# Patient Record
Sex: FEMALE | Race: WHITE | NOT HISPANIC OR LATINO | Employment: FULL TIME | URBAN - METROPOLITAN AREA
[De-identification: names, ages, dates, MRNs, and addresses within clinical notes are randomized per-mention and may not be internally consistent; named-entity substitution may affect disease eponyms.]

---

## 2021-05-07 ENCOUNTER — CONVERSION ENCOUNTER (OUTPATIENT)
Dept: FAMILY MEDICINE CLINIC | Facility: CLINIC | Age: 21
End: 2021-05-07

## 2021-05-07 ENCOUNTER — OFFICE VISIT CONVERTED (OUTPATIENT)
Dept: FAMILY MEDICINE CLINIC | Facility: CLINIC | Age: 21
End: 2021-05-07
Attending: STUDENT IN AN ORGANIZED HEALTH CARE EDUCATION/TRAINING PROGRAM

## 2021-06-05 NOTE — H&P
History and Physical      Patient Name: Shelly Gray   Patient ID: [id number]   Sex: Female   YOB: 2000        Visit Date: May 7, 2021    Provider: Edwin Crowe MD   [place] [facility] [place] Address: [address] [place] [address], [address]  [place], [place]  [id number]   Location Phone: [phone]          Chief Complaint  · New patient - Establish care      History Of Present Illness  Shelly Gray is a 20 year old /White female who presents for evaluation and treatment of:      New patient to establish new primary care.  Previous PCP was Dr. Hastings, pediatrics.    PMH: Seasonal allergies, intermittent headaches, menstrual cramps, developmental delay, obesity    Mental disability:  Patient dx as 'Mildly Mentally Disabled'.  She is  currently under guardianship of the [facility].  Patient requires written Rx to take any medication for any PRN medications.    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  Patient states her mother has no feeling on the right side of her body.    Flu:  Declined  Pap: 2019, WNL  Covid Vaccine: Pfizer 2/6/21, 3/6/21      --Patient comes to the clinic today with foster mother for establishing care and annual visit.    Patient had Pap smear done; records needed.    Patient denies any chest pain or shortness of breath on exertion.       Past Medical History  Disease Name Date Onset Notes   Seasonal allergies --  --          Past Surgical History  Procedure Name Date Notes   Ankle surgery --  --    Magazine Tooth Extraction --  --          Medication List  Name Date Started Instructions   Flonase Allergy Relief 50 mcg/actuation nasal spray,suspension  spray 1 - 2 sprays (50 - 100 mcg) in each nostril by intranasal route once daily as needed         Allergy List  Allergen Name  "Date Reaction Notes   NO KNOWN DRUG ALLERGIES --  --  --          Family Medical History  Disease Name Relative/Age Notes   Family history of diabetes mellitus Mother/   --    Family history of mental disorder  --          Social History  Finding Status Start/Stop Quantity Notes   Alcohol Never --/-- --  --    Sedentary --  --/-- --  --    Tobacco Never --/-- --  --          Immunizations  NameDate Admin Mfg Trade Name Lot Number Route Inj VIS Given VIS Publication   COVID Kdmyqj1103/06/2021 NE Not Entered  NE NE 05/07/2021    Comments:    COVID Adedes6002/06/2021 NE Not Entered  NE NE 05/07/2021    Comments:    InfluenzaRefused 05/07/2021 NE Not Entered  NE NE     Comments:          Review of Systems  · Constitutional  o Denies  o : fatigue  · Eyes  o Denies  o : blurred vision, changes in vision  · HENT  o Denies  o : headaches  · Cardiovascular  o Denies  o : chest pain, irregular heart beats, rapid heart rate, dyspnea on exertion  · Respiratory  o Denies  o : shortness of breath, wheezing, cough  · Gastrointestinal  o Denies  o : nausea, vomiting, diarrhea, constipation, abdominal pain, blood in stools, melena  · Genitourinary  o Denies  o : frequency, dysuria, hematuria  · Integument  o Denies  o : rash, new skin lesions  · Musculoskeletal  o Denies  o : joint pain, joint swelling, muscle pain  · Endocrine  o Denies  o : polyuria, polydipsia      Vitals  Date Time BP Position Site L\R Cuff Size HR RR TEMP (F) WT  HT  BMI kg/m2 BSA m2 O2 Sat FR L/min FiO2 HC       05/07/2021 02:24 /66 Sitting    85 - R  98.1 220lbs 1oz 5'  3\" 38.98 2.11 98 %  21%          Physical Examination  · Constitutional  o Appearance  o : well-nourished, in no acute distress  · Eyes  o Conjunctivae  o : conjunctivae normal  o Sclerae  o : sclerae white  o Pupils and Irises  o : pupils equal and round  o Eyelids/Ocular Adnexae  o : eyelid appearance normal, no exudates present  · Neck  o Inspection/Palpation  o : normal appearance, no " masses or tenderness, trachea midline  o Range of Motion  o : cervical range of motion within normal limits  o Thyroid  o : gland size normal, nontender, no nodules or masses present on palpation  · Respiratory  o Respiratory Effort  o : breathing unlabored  o Inspection of Chest  o : normal appearance  o Auscultation of Lungs  o : normal breath sounds throughout inspiration and expiration  · Cardiovascular  o Heart  o :   § Auscultation of Heart  § : regular rate and rhythm, no murmurs, gallops or rubs  o Peripheral Vascular System  o :   § Carotid Arteries  § : normal pulses bilaterally, no bruits present  § Pedal Pulses  § : pulses 2 bilaterally  § Extremities  § : no clubbing or edema  · Gastrointestinal  o Abdominal Examination  o : abdomen nontender to palpation, tone normal without rigidity or guarding, no masses present, bowel sounds present in all four quadrants  · Skin and Subcutaneous Tissue  o General Inspection  o : no rashes or lesions present, no areas of discoloration  o Body Hair  o : hair normal for age, general body hair distribution normal for age  o Digits and Nails  o : no clubbing, cyanosis, deformities or edema present, normal appearing nails  · Neurologic  o Mental Status Examination  o :   § Orientation  § : grossly oriented to person, place and time  o Gait and Station  o : normal gait, able to stand without difficulty          Assessment  · Screening for depression     V79.0/Z13.89  · Annual physical exam     V70.0/Z00.00  · Obesity     278.00/E66.9  · Establishing care with new doctor, encounter for     V65.8/Z76.89  · Seasonal allergies     477.9/J30.2  · Headache     784.0/R51.9  · Development delay     783.40/R62.50  · Screening for diabetes mellitus     V77.1/Z13.1  · Screening for deficiency anemia     V78.1/Z13.0  · Screening for thyroid disorder     V77.0/Z13.29  · Hypoesthesia     782.0/R20.1  No feeling/sensation of total left side  · Menstrual cramps     625.3/N94.6       Total  time spent 30 minutes       Plan  · Orders  o ACO-18: Negative screen for clinical depression using a standardized tool () - V79.0/Z13.89 - 05/07/2021   0  o Hgb A1c [facility] ([id number]) - 783.40/R62.50, 278.00/E66.9, V77.1/Z13.1 - 05/07/2021  o Physical, Primary Care Panel (CBC, CMP, Lipid, TSH) [facility] ([id number], [id number], 90850, 71903) - V70.0/Z00.00, 278.00/E66.9, V78.1/Z13.0, V77.0/Z13.29 - 05/07/2021  o ACO-14: Influenza immunization was not administered for reasons documented [facility] () - - 05/07/2021   Patient refused  o ACO-39: Current medications updated and reviewed (1159F, ) - - 05/07/2021  · Medications  o ibuprofen 600 mg oral tablet   SIG: take 1 tablet (600 mg) by oral route every 6 hours as needed with food for 30 days   DISP: (30) Tablet with 1 refills  Prescribed on 05/07/2021     o Flonase Allergy Relief 50 mcg/actuation nasal spray,suspension   SIG: spray 1 - 2 sprays (50 - 100 mcg) in each nostril by intranasal route once daily as needed for 30 days   DISP: (1) Puff with 0 refills  Prescribed on 05/07/2021     o Medications have been Reconciled  o Transition of Care or Provider Policy  · Instructions  o Depression Screen completed and scanned into the EMR under the designated folder within the patient's documents.  o Today's PHQ-9 result is _0__  o The provider screening met the required time of 15 minutes.  o Reviewed health maintenance flowsheet and updated information. Orders were placed and/or patient's response was documented.  o Patient was educated/instructed on their diagnosis, treatment and medications prior to discharge from the clinic today.  o Patient counseled to reduce calorie intake.  o Patient was instructed to exercise regularly.  o Patient instructed to seek medical attention urgently for new or worsening symptoms.  o Call the office with any concerns or questions.  o Patient given paper scripts today.  o Minutes spent with patient including greater than 50% in  Education/Counseling/Care Coordination.  o Time spent with the patient was minutes, more than 50% face to face.  o Discussed Covid-19 precautions including, but not limited to, social distancing, avoid touching your face, and hand washing.   · Disposition  o Call or Return if symptoms worsen or persist.  o Follow Up PRN.  o Follow Up 6 months.            Electronically Signed by: Edwin Crowe MD -Author on May 7, 2021 03:17:25 PM

## 2021-06-29 ENCOUNTER — TRANSCRIBE ORDERS (OUTPATIENT)
Dept: LAB | Facility: HOSPITAL | Age: 21
End: 2021-06-29

## 2021-06-29 ENCOUNTER — LAB (OUTPATIENT)
Dept: LAB | Facility: HOSPITAL | Age: 21
End: 2021-06-29

## 2021-06-29 DIAGNOSIS — E13.10 DIABETIC KETOSIS WITHOUT COMA (HCC): ICD-10-CM

## 2021-06-29 DIAGNOSIS — Z13.0 SCREENING FOR IRON DEFICIENCY ANEMIA: ICD-10-CM

## 2021-06-29 DIAGNOSIS — Z00.00 ROUTINE GENERAL MEDICAL EXAMINATION AT A HEALTH CARE FACILITY: Primary | ICD-10-CM

## 2021-06-29 DIAGNOSIS — E66.9 OBESITY, UNSPECIFIED CLASSIFICATION, UNSPECIFIED OBESITY TYPE, UNSPECIFIED WHETHER SERIOUS COMORBIDITY PRESENT: ICD-10-CM

## 2021-06-29 DIAGNOSIS — Z13.29 SCREENING FOR THYROID DISORDER: ICD-10-CM

## 2021-06-29 DIAGNOSIS — Z00.00 ROUTINE GENERAL MEDICAL EXAMINATION AT A HEALTH CARE FACILITY: ICD-10-CM

## 2021-06-29 LAB
ALBUMIN SERPL-MCNC: 4.1 G/DL (ref 3.5–5.2)
ALBUMIN/GLOB SERPL: 1.5 G/DL
ALP SERPL-CCNC: 86 U/L (ref 39–117)
ALT SERPL W P-5'-P-CCNC: 13 U/L (ref 1–33)
ANION GAP SERPL CALCULATED.3IONS-SCNC: 8.7 MMOL/L (ref 5–15)
AST SERPL-CCNC: 19 U/L (ref 1–32)
BASOPHILS # BLD AUTO: 0.02 10*3/MM3 (ref 0–0.2)
BASOPHILS NFR BLD AUTO: 0.3 % (ref 0–1.5)
BILIRUB SERPL-MCNC: 0.6 MG/DL (ref 0–1.2)
BUN SERPL-MCNC: 13 MG/DL (ref 6–20)
BUN/CREAT SERPL: 23.2 (ref 7–25)
CALCIUM SPEC-SCNC: 8.8 MG/DL (ref 8.6–10.5)
CHLORIDE SERPL-SCNC: 103 MMOL/L (ref 98–107)
CHOLEST SERPL-MCNC: 123 MG/DL (ref 0–200)
CO2 SERPL-SCNC: 25.3 MMOL/L (ref 22–29)
CREAT SERPL-MCNC: 0.56 MG/DL (ref 0.57–1)
DEPRECATED RDW RBC AUTO: 38.8 FL (ref 37–54)
EOSINOPHIL # BLD AUTO: 0.03 10*3/MM3 (ref 0–0.4)
EOSINOPHIL NFR BLD AUTO: 0.5 % (ref 0.3–6.2)
ERYTHROCYTE [DISTWIDTH] IN BLOOD BY AUTOMATED COUNT: 11.9 % (ref 12.3–15.4)
GFR SERPL CREATININE-BSD FRML MDRD: 137 ML/MIN/1.73
GLOBULIN UR ELPH-MCNC: 2.7 GM/DL
GLUCOSE SERPL-MCNC: 82 MG/DL (ref 65–99)
HBA1C MFR BLD: 5.18 % (ref 4.8–5.6)
HCT VFR BLD AUTO: 42.3 % (ref 34–46.6)
HDLC SERPL-MCNC: 44 MG/DL (ref 40–60)
HGB BLD-MCNC: 13.9 G/DL (ref 12–15.9)
IMM GRANULOCYTES # BLD AUTO: 0.02 10*3/MM3 (ref 0–0.05)
IMM GRANULOCYTES NFR BLD AUTO: 0.3 % (ref 0–0.5)
LDLC SERPL CALC-MCNC: 68 MG/DL (ref 0–100)
LDLC/HDLC SERPL: 1.57 {RATIO}
LYMPHOCYTES # BLD AUTO: 2.17 10*3/MM3 (ref 0.7–3.1)
LYMPHOCYTES NFR BLD AUTO: 36.8 % (ref 19.6–45.3)
MCH RBC QN AUTO: 29.3 PG (ref 26.6–33)
MCHC RBC AUTO-ENTMCNC: 32.9 G/DL (ref 31.5–35.7)
MCV RBC AUTO: 89.1 FL (ref 79–97)
MONOCYTES # BLD AUTO: 0.43 10*3/MM3 (ref 0.1–0.9)
MONOCYTES NFR BLD AUTO: 7.3 % (ref 5–12)
NEUTROPHILS NFR BLD AUTO: 3.22 10*3/MM3 (ref 1.7–7)
NEUTROPHILS NFR BLD AUTO: 54.8 % (ref 42.7–76)
NRBC BLD AUTO-RTO: 0 /100 WBC (ref 0–0.2)
PLATELET # BLD AUTO: 229 10*3/MM3 (ref 140–450)
PMV BLD AUTO: 10.9 FL (ref 6–12)
POTASSIUM SERPL-SCNC: 4 MMOL/L (ref 3.5–5.2)
PROT SERPL-MCNC: 6.8 G/DL (ref 6–8.5)
RBC # BLD AUTO: 4.75 10*6/MM3 (ref 3.77–5.28)
SODIUM SERPL-SCNC: 137 MMOL/L (ref 136–145)
TRIGL SERPL-MCNC: 49 MG/DL (ref 0–150)
TSH SERPL DL<=0.05 MIU/L-ACNC: 2.07 UIU/ML (ref 0.27–4.2)
VLDLC SERPL-MCNC: 11 MG/DL (ref 5–40)
WBC # BLD AUTO: 5.89 10*3/MM3 (ref 3.4–10.8)

## 2021-06-29 PROCEDURE — 85025 COMPLETE CBC W/AUTO DIFF WBC: CPT

## 2021-06-29 PROCEDURE — 80061 LIPID PANEL: CPT

## 2021-06-29 PROCEDURE — 84443 ASSAY THYROID STIM HORMONE: CPT

## 2021-06-29 PROCEDURE — 36415 COLL VENOUS BLD VENIPUNCTURE: CPT

## 2021-06-29 PROCEDURE — 83036 HEMOGLOBIN GLYCOSYLATED A1C: CPT

## 2021-06-29 PROCEDURE — 80053 COMPREHEN METABOLIC PANEL: CPT

## 2021-07-15 VITALS
HEIGHT: 63 IN | DIASTOLIC BLOOD PRESSURE: 66 MMHG | WEIGHT: 220.06 LBS | TEMPERATURE: 98.1 F | BODY MASS INDEX: 38.99 KG/M2 | HEART RATE: 85 BPM | SYSTOLIC BLOOD PRESSURE: 124 MMHG | OXYGEN SATURATION: 98 %

## 2021-11-09 ENCOUNTER — OFFICE VISIT (OUTPATIENT)
Dept: FAMILY MEDICINE CLINIC | Facility: CLINIC | Age: 21
End: 2021-11-09

## 2021-11-09 VITALS
DIASTOLIC BLOOD PRESSURE: 74 MMHG | BODY MASS INDEX: 40.75 KG/M2 | RESPIRATION RATE: 18 BRPM | WEIGHT: 230 LBS | OXYGEN SATURATION: 99 % | HEART RATE: 96 BPM | SYSTOLIC BLOOD PRESSURE: 116 MMHG | TEMPERATURE: 97.5 F | HEIGHT: 63 IN

## 2021-11-09 DIAGNOSIS — R20.1 HYPOESTHESIA OF SKIN: ICD-10-CM

## 2021-11-09 DIAGNOSIS — J30.2 SEASONAL ALLERGIES: Primary | ICD-10-CM

## 2021-11-09 DIAGNOSIS — R62.50 DEVELOPMENTAL DELAY: ICD-10-CM

## 2021-11-09 DIAGNOSIS — Z30.9 ENCOUNTER FOR CONTRACEPTIVE MANAGEMENT, UNSPECIFIED TYPE: ICD-10-CM

## 2021-11-09 DIAGNOSIS — Z23 NEEDS FLU SHOT: ICD-10-CM

## 2021-11-09 PROCEDURE — 90686 IIV4 VACC NO PRSV 0.5 ML IM: CPT | Performed by: STUDENT IN AN ORGANIZED HEALTH CARE EDUCATION/TRAINING PROGRAM

## 2021-11-09 PROCEDURE — 99213 OFFICE O/P EST LOW 20 MIN: CPT | Performed by: STUDENT IN AN ORGANIZED HEALTH CARE EDUCATION/TRAINING PROGRAM

## 2021-11-09 PROCEDURE — 90471 IMMUNIZATION ADMIN: CPT | Performed by: STUDENT IN AN ORGANIZED HEALTH CARE EDUCATION/TRAINING PROGRAM

## 2021-11-09 RX ORDER — FLUTICASONE PROPIONATE 50 MCG
1 SPRAY, SUSPENSION (ML) NASAL DAILY PRN
Qty: 11.1 ML | Refills: 1 | Status: SHIPPED | OUTPATIENT
Start: 2021-11-09 | End: 2022-05-10 | Stop reason: SDUPTHER

## 2021-11-09 RX ORDER — FLUTICASONE PROPIONATE 50 MCG
SPRAY, SUSPENSION (ML) NASAL
COMMUNITY
End: 2021-11-09 | Stop reason: SDUPTHER

## 2021-11-09 NOTE — PROGRESS NOTES
"Chief Complaint  Follow-up on seasonal allergies/developmental delay/medication management and talk about contraception    Subjective         Shelly Gray is a 21 y.o. female who presents to [facility]  21 years old female brought into the clinic by caregiver who has a past medical history of developmental delay/seasonal allergy for evaluation of chronic conditions/medication management and contraceptions management.    Seasonal allergies; controlled on and off use of Flonase    Caregiver reports, patient recently found a boyfriend.  Currently not sexually active but they would like to get educated about contraceptions.  Patient is a non-smoker/no anxiety/depression or any migraines.  No family history of any blood disorders.    Patient denies any chest pain or shortness of breath on exertion, no change with her health since her last visit.    Patient does not want to get pregnant for at least next 5 to 10 years.    Review of Systems   Objective   Vital Signs:   Vitals:    11/09/21 0828   BP: 116/74   Pulse: 96   Resp: 18   Temp: 97.5 °F (36.4 °C)   SpO2: 99%   Weight: 104 kg (230 lb)   Height: 160 cm (63\")      Body mass index is 40.74 kg/m².   Physical Exam  Vitals reviewed.   Constitutional:       Appearance: Normal appearance. She is well-developed.   HENT:      Head: Normocephalic and atraumatic.      Right Ear: External ear normal.      Left Ear: External ear normal.      Mouth/Throat:      Pharynx: No oropharyngeal exudate.   Eyes:      Conjunctiva/sclera: Conjunctivae normal.      Pupils: Pupils are equal, round, and reactive to light.   Cardiovascular:      Rate and Rhythm: Normal rate and regular rhythm.      Heart sounds: No murmur heard.  No friction rub. No gallop.    Pulmonary:      Effort: Pulmonary effort is normal.      Breath sounds: Normal breath sounds. No wheezing or rhonchi.   Abdominal:      General: Bowel sounds are normal. There is no distension.      " Palpations: Abdomen is soft.      Tenderness: There is no abdominal tenderness.   Skin:     General: Skin is warm and dry.   Neurological:      Mental Status: She is alert and oriented to person, place, and time.      Cranial Nerves: No cranial nerve deficit.   Psychiatric:         Mood and Affect: Mood and affect normal.         Behavior: Behavior normal.         Thought Content: Thought content normal.         Judgment: Judgment normal.                       Assessment and Plan   Diagnoses and all orders for this visit:    1. Seasonal allergies (Primary)  -     fluticasone (Flonase) 50 MCG/ACT nasal spray; 1 spray into the nostril(s) as directed by provider Daily As Needed for Rhinitis.  Dispense: 11.1 mL; Refill: 1    2. Encounter for contraceptive management, unspecified type  Comments:  Education provided, discussed about options; patient would like to explore more about IUD  Orders:  -     Ambulatory Referral to Obstetrics / Gynecology    3. Needs flu shot  -     FluLaval/Fluarix/Fluzone >6 Months (3421-3138)    4. Hypoesthesia of skin    5. Developmental delay        Patient may continue Flonase as prescribed, patient to follow-up with OB/GYN for possible evaluation of IUD and further options.  Will discuss about vaccinations at next visit.    Follow Up   Return in about 1 year (around 11/9/2022) for Recheck, Annual physical.  Patient was given instructions and counseling regarding her condition or for health maintenance advice. Please see specific information pulled into the AVS if appropriate.

## 2021-12-17 ENCOUNTER — OFFICE VISIT (OUTPATIENT)
Dept: OBSTETRICS AND GYNECOLOGY | Facility: CLINIC | Age: 21
End: 2021-12-17

## 2021-12-17 VITALS
SYSTOLIC BLOOD PRESSURE: 126 MMHG | HEIGHT: 61 IN | WEIGHT: 230.4 LBS | BODY MASS INDEX: 43.5 KG/M2 | HEART RATE: 79 BPM | DIASTOLIC BLOOD PRESSURE: 80 MMHG

## 2021-12-17 DIAGNOSIS — Z01.419 WELL WOMAN EXAM WITH ROUTINE GYNECOLOGICAL EXAM: Primary | ICD-10-CM

## 2021-12-17 DIAGNOSIS — Z30.09 ENCOUNTER FOR OTHER GENERAL COUNSELING OR ADVICE ON CONTRACEPTION: ICD-10-CM

## 2021-12-17 PROCEDURE — 3008F BODY MASS INDEX DOCD: CPT | Performed by: OBSTETRICS & GYNECOLOGY

## 2021-12-17 PROCEDURE — 2014F MENTAL STATUS ASSESS: CPT | Performed by: OBSTETRICS & GYNECOLOGY

## 2021-12-17 PROCEDURE — 99385 PREV VISIT NEW AGE 18-39: CPT | Performed by: OBSTETRICS & GYNECOLOGY

## 2021-12-17 NOTE — PATIENT INSTRUCTIONS
Intrauterine Device  You will learn what IUDs are, their length of use, and side effects.  To view the content, go to this web address:  [email or web address]    This video will  on: 2023. If you need access to this video following this date, please reach out to the healthcare provider who assigned it to you.  This information is not intended to replace advice given to you by your health care provider. Make sure you discuss any questions you have with your health care provider.  [employer]’s editorial and clinical teams regularly review and update content to ensure it is up-to-date with changing practice standards and recognized medical guidelines.  Document Revised: 2021  [employer] Patient Education ©  [employer]    Intrauterine Device Insertion, Care After  This sheet gives you information about how to care for yourself after your procedure. Your health care provider may also give you more specific instructions. If you have problems or questions, contact your health care provider.  What can I expect after the procedure?  After the procedure, it is common to have:  · Cramps and pain in the abdomen.  · Bleeding. It may be light or heavy. This may last for a few days.  · Lower back pain.  · Dizziness.  · Headaches.  · Nausea.  Follow these instructions at home:    · Before resuming sexual activity, check to make sure that you can feel the IUD string or strings. You should be able to feel the end of the string below the opening of your cervix. If your IUD string is in place, you may resume sexual activity.  ? If you had a hormonal IUD inserted more than 7 days after your most recent period started, you will need to use a backup method of birth control for 7 days after IUD insertion. Ask your health care provider whether this applies to you.  · Continue to check that the IUD is still in place by feeling for the strings after every menstrual period, or once a month.  · An IUD will not  protect you from sexually transmitted infections (STIs). Use methods to prevent the exchange of body fluids between partners (barrier protection) every time you have sex. Barrier protection can be used during oral, vaginal, or anal sex. Commonly used barrier methods include:  ? Male condom.  ? Female condom.  ? Dental dam.  · Take over-the-counter and prescription medicines only as told by your health care provider.  · Keep all follow-up visits as told by your health care provider. This is important.  Contact a health care provider if:  · You feel light-headed or weak.  · You have any of the following problems with your IUD string or strings:  ? The string bothers or hurts you or your sexual partner.  ? You cannot feel the string.  ? The string has gotten longer.  · You can feel the IUD in your vagina.  · You think you may be pregnant, or you miss your menstrual period.  · You think you may have a sexually transmitted infection (STI).  Get help right away if:  · You have flu-like symptoms, such as tiredness (fatigue) and muscle aches.  · You have a fever and chills.  · You have bleeding that is heavier or lasts longer than a normal menstrual cycle.  · You have abnormal or bad-smelling discharge from your vagina.  · You develop abdominal pain that is new, is getting worse, or is not in the same area of earlier cramping and pain.  · You have pain during sexual activity.  Summary  · After the procedure, it is common to have cramps and pain in the abdomen. It is also common to have light bleeding or heavier bleeding that is like your menstrual period.  · Continue to check that the IUD is still in place by feeling for the strings after every menstrual period, or once a month.  · Keep all follow-up visits as told by your health care provider. This is important.  · Contact your health care provider if you have problems with your IUD strings, such as the string getting longer or bothering you or your sexual partner.  This  information is not intended to replace advice given to you by your health care provider. Make sure you discuss any questions you have with your health care provider.  Document Revised: 12/08/2020 Document Reviewed: 12/08/2020  Elsevier Patient Education © 2021 [employer]    Intrauterine Device Insertion  An intrauterine device (IUD) is a medical device that is inserted into the uterus to prevent pregnancy. It is a small, T-shaped device that has one or two nylon strings hanging down from it. The strings hang out of the lower part of the uterus (cervix) to allow for future IUD removal. There are two types of IUDs:  · Hormone IUD. This type of IUD is made of plastic and contains the hormone progestin (synthetic progesterone). A hormone IUD may last 3-5 years, depending on which one you have. Synthetic progesterone prevents pregnancy by:  ? Thickening cervical mucus to prevent sperm from entering the uterus.  ? Thinning the uterine lining to prevent a fertilized egg from implanting there.  · Copper IUD. This type of IUD has copper wire wrapped around it. A copper IUD may last up to 10 years. Copper prevents pregnancy by making the uterus and fallopian tubes produce a fluid that kills sperm.  Tell a health care provider about:  · Any allergies you have.  · All medicines you are taking, including vitamins, herbs, eye drops, creams, and over-the-counter medicines.  · Any surgeries you have had.  · Any medical conditions you have, including any sexually transmitted infections (STIs) you may have.  · Whether you are pregnant or may be pregnant.  What are the risks?  Generally, this is a safe procedure. However, problems may occur, including:  · Infection.  · Bleeding.  · Allergic reactions to medicines.  · Puncture (perforation) of the uterus or damage to other structures or organs.  · Accidental placement of the IUD either in the muscle layer of the uterus (myometrium) or outside the uterus.  · The IUD falling out of  the uterus (expulsion). This is more common among women who have recently had a child.  · Higher risk of an egg being fertilized outside your uterus (ectopic pregnancy).This is rare.  · Pelvic inflammatory disease (PID), which is an infection in the uterus and fallopian tubes. The IUD does not cause the infection. The infection is usually from an unknown sexually transmitted infection (STI). This is rare, and it usually happens during the first 20 days after the IUD is inserted.  What happens before the procedure?  · Ask your health care provider about:  ? Changing or stopping your regular medicines. This is especially important if you are taking diabetes medicines or blood thinners.  ? Taking over-the-counter medicines, vitamins, herbs, and supplements.  · Talk with your health care provider about when to schedule your IUD placement.  · Your health care provider may recommend taking over-the-counter pain medicines before the procedure. These medicines include ibuprofen and naproxen.  · You may have tests for:  ? Pregnancy. A pregnancy test involves having a urine or blood sample taken.  ? Sexually transmitted infections (STIs). Placing an IUD in someone who has an STI can make the infection worse.  ? Cervical cancer. You may have a Pap test to check for this type of cancer. This means collecting cells from your cervix to be checked under a microscope.  · You may have a physical exam to determine the size and position of your uterus.  What happens during the procedure?  · A tool (speculum) will be placed in your vagina and widened so that your health care provider can see your cervix.  · Medicine, or antiseptic, may be applied to your cervix to help lower your risk of infection.  · You may be given an anesthetic medicine to numb each side of your cervix. This medicine is usually given by an injection into the cervix.  · A tool called a uterine sound will be inserted into your uterus to check the length of your  uterus and the direction that your uterus may be tilted.  · A slim instrument or tube (IUD ) that holds the IUD will be inserted into your vagina, through your cervical canal, and into your uterus.  · The IUD will be placed in the uterus, and the IUD  will be removed.  · The strings that are attached to the IUD will be trimmed so that they lie just below the cervix.  · The speculum will be removed.  The procedure may vary among health care providers and hospitals.  What can I expect after procedure?  · You may have bleeding after the procedure. This is normal. It varies from light bleeding (spotting) for a few days to menstrual-like bleeding.  · You may have cramping and pain in the abdomen.  · You may feel dizzy or light-headed.  · You may have lower back pain.  · You may have headaches and nausea.  Follow these instructions at home:  · Before resuming sexual activity, check to make sure that you can feel the IUD string or strings. You should be able to feel the end of the string below the opening of your cervix. If your IUD string is in place, you may resume sexual activity.  ? If you had a hormonal IUD inserted more than 7 days after your most recent period started, you will need to use a backup method of birth control for 7 days after IUD insertion. Ask your health care provider whether this applies to you.  · Continue to check that the IUD is still in place by feeling for the strings after every menstrual period, or once a month.  · An IUD will not protect you from sexually transmitted infections (STIs). Use methods to prevent the exchange of body fluids between partners (barrier protection) every time you have sex. Barrier protection can be used during oral, vaginal, or anal sex. Commonly used barrier methods include:  ? Male condom.  ? Female condom.  ? Dental dam.  · Take over-the-counter and prescription medicines only as told by your health care provider.  · Keep all follow-up visits. This  is important.  Contact a health care provider if:  · You feel light-headed or weak.  · You have any of the following problems with your IUD string or strings:  ? The string bothers or hurts you or your sexual partner.  ? You cannot feel the string.  ? The string has gotten longer.  · You can feel the IUD in your vagina.  · You think you may be pregnant, or you miss your menstrual period.  · You think you may have a sexually transmitted infection (STI).  Get help right away if you:  · You have flu-like symptoms, such as tiredness (fatigue) and muscle aches.  · You have a fever and chills.  · You have bleeding that is heavier or lasts longer than a normal menstrual cycle.  · You have abnormal or bad-smelling discharge from your vagina.  · You develop abdominal pain that is new, is getting worse, or is not in the same area of earlier cramping and pain.  · You have pain during sexual activity.  Summary  · An intrauterine device (IUD) is a small, T-shaped device that has one or two nylon strings hanging down from it. You may have a copper IUD or a hormone IUD.  · Ask your health care provider what you need to do before the procedure. You may have some tests and you may have to change or stop some medicines.  · You may have bleeding after the procedure. This is normal. It varies from light spotting for a few days to menstrual-like bleeding.  · Check to make sure that you can feel the IUD strings before you resume sexual activity. Check the strings after every menstrual period or once a month.  · An IUD does not protect against STIs. Use other methods to protect yourself against infections.  This information is not intended to replace advice given to you by your health care provider. Make sure you discuss any questions you have with your health care provider.  Document Revised: 06/30/2021 Document Reviewed: 06/30/2021  [employer] Patient Education © 2021 [employer]

## 2021-12-20 NOTE — PROGRESS NOTES
Well Woman Visit    Chief Complaint   Patient presents with   • Discuss BC           HPI  Shelly Gray is a 21 y.o. female,  who presents for annual well woman exam.LMP 20 Pt is single has boyfriend.  Not sexually active.   Pt is no gyn surgery  Menses q 60 days  x 5 days with 3 days heavy. Patient is having symptoms of urinary incontinence No. Pt is interested in birth control.  She does not desire pregnancy.  She is not currently sexually active.    Additional OB/GYN History   Current contraception: contraceptive methods: None  Desires to: change to paragard iud contraception  Last Pap :   Last Completed Pap Smear     This patient has no relevant Health Maintenance data.          History of abnormal Pap smear: no  Last MMG :   Last Completed Mammogram     This patient has no relevant Health Maintenance data.         Last Colonoscopy :   Last Completed Colonoscopy     This patient has no relevant Health Maintenance data.        Hx My[facility] intake? : N/A.  Qualified? : N/A    AllergiesAripiprazole, Quetiapine, Risperidone, Atomoxetine, and Methylphenidate   Family history of uterine, colon, breast, or ovarian cancer: unkinown  Tobacco Usage?: No   OB History    No obstetric history on file.         The additional following portions of the patient's history were reviewed and updated as appropriate: allergies, current medications, past family history, past medical history, past social history, past surgical history and problem list.    Review of Systems   Constitutional: Negative.    HENT: Negative.    Eyes: Negative.    Respiratory: Negative.    Cardiovascular: Negative.    Gastrointestinal: Negative.    Endocrine: Negative.    Genitourinary: Negative.    Musculoskeletal: Negative.    Skin: Negative.    Allergic/Immunologic: Negative.    Neurological: Negative.    Hematological: Negative.   "  Psychiatric/Behavioral: Negative.        I have reviewed and agree with the HPI, ROS, and historical information as entered above. Talita ARITA Andrea, DO    Objective   /80   Pulse 79   Ht 154.9 cm (61\")   Wt 105 kg (230 lb 6.4 oz)   LMP 12/14/2021   Breastfeeding Yes   BMI 43.53 kg/m²     Physical Exam  Vitals and nursing note reviewed. Exam conducted with a chaperone present.   Constitutional:       Appearance: Normal appearance.   HENT:      Head: Normocephalic.   Neck:      Thyroid: No thyroid mass or thyromegaly.   Cardiovascular:      Rate and Rhythm: Regular rhythm.      Heart sounds: Normal heart sounds. No murmur heard.      Pulmonary:      Effort: Pulmonary effort is normal.      Breath sounds: Normal breath sounds. No wheezing.   Chest:   Breasts:      Right: Normal. No swelling, bleeding, inverted nipple, mass, nipple discharge, skin change, tenderness, axillary adenopathy or supraclavicular adenopathy.      Left: Normal. No swelling, bleeding, inverted nipple, mass, nipple discharge, skin change, tenderness, axillary adenopathy or supraclavicular adenopathy.       Abdominal:      General: There is no distension.      Palpations: Abdomen is soft. There is no mass.      Tenderness: There is no abdominal tenderness. There is no guarding or rebound.      Hernia: No hernia is present.   Genitourinary:     General: Normal vulva.      Labia:         Right: No lesion.         Left: No lesion.       Urethra: No urethral pain or urethral lesion.      Vagina: Normal. No vaginal discharge, tenderness or prolapsed vaginal walls.      Cervix: Normal.      Uterus: Normal.       Adnexa: Right adnexa normal and left adnexa normal.      Rectum: Normal.      Comments: Nml female external genitalia.  Cervix is parous. Uterus axial normal size shape and consistency.  No adnexal masses are appreciated. Large amount of menstrual blood in vault.    Musculoskeletal:      Cervical back: Normal range of motion and " neck supple. No tenderness.   Lymphadenopathy:      Upper Body:      Right upper body: No supraclavicular or axillary adenopathy.      Left upper body: No supraclavicular or axillary adenopathy.   Skin:     General: Skin is warm and dry.   Neurological:      Mental Status: She is alert and oriented to person, place, and time.   Psychiatric:         Mood and Affect: Mood normal.         Behavior: Behavior normal.         Thought Content: Thought content normal.            Assessment and Plan    WWE and Contraception    Diagnoses and all orders for this visit:    1. Well woman exam with routine gynecological exam (Primary)    2. Encounter for other general counseling or advice on contraception        Counselin. Breast Health- Clinical breast exam and mammogram reviewed specifically American Cancer Society recommendations for screening specifically to her, and self  breast awareness monthly  2. Pap updated today No bleeding too heavy  3. Smoking status - Non smoker  4. Colon health - screening  colonoscopy as per American Cancer Society recommendations.  5. Bone health - Weight bearing exercise, dietary calcium recommendations and vitamin D reviewed  6. Encouraged to be wary of information obtained via social media and internet based on source and search  7. Follow up prn and one year  8. Eat a diet that includes plenty of vegetables, fruits, low-fat dairy products, and lean protein  9. Do not eat a lot of foods that are high in solid fats, added sugars, or sodium  10. Maintain healthy weight  11. Get regular exercise: at least 150 minutes each week. The exercise should increase your heart rate and make you sweat (moderate-intensity exercise).  12. Do strengthening exercises at least 2x per week. This in addition to moderate-intensity exercise.  13. Spend less time sitting.  Even light physical activity can be beneficial.  14. Watch cholesterol and blood lipids     Preventative:  • MMG  • Colonoscopy          She  understands the importance of having the above performed in a timely fashion.  The risks of not performing them include, but are not limited to, advanced cancer stages, bone loss from osteoporosis and/or subsequent increase in morbidity and/or mortality.  She is encouraged to review her results online and/or contact or office if she has questions.     Follow Up:  Will precert for paragard IUD and patient will follow up for placement.        Talita Andrea, DO  12/17/2021

## 2022-05-10 ENCOUNTER — OFFICE VISIT (OUTPATIENT)
Dept: FAMILY MEDICINE CLINIC | Facility: CLINIC | Age: 22
End: 2022-05-10

## 2022-05-10 VITALS
OXYGEN SATURATION: 98 % | TEMPERATURE: 98 F | SYSTOLIC BLOOD PRESSURE: 112 MMHG | BODY MASS INDEX: 44.05 KG/M2 | DIASTOLIC BLOOD PRESSURE: 76 MMHG | RESPIRATION RATE: 18 BRPM | WEIGHT: 233.3 LBS | HEIGHT: 61 IN | HEART RATE: 84 BPM

## 2022-05-10 DIAGNOSIS — J30.2 SEASONAL ALLERGIES: Primary | ICD-10-CM

## 2022-05-10 DIAGNOSIS — R20.1 HYPOESTHESIA OF SKIN: ICD-10-CM

## 2022-05-10 DIAGNOSIS — Z00.00 ANNUAL PHYSICAL EXAM: ICD-10-CM

## 2022-05-10 DIAGNOSIS — R62.50 DEVELOPMENTAL DELAY: ICD-10-CM

## 2022-05-10 DIAGNOSIS — Z11.1 ENCOUNTER FOR PPD TEST: ICD-10-CM

## 2022-05-10 PROCEDURE — 86580 TB INTRADERMAL TEST: CPT | Performed by: STUDENT IN AN ORGANIZED HEALTH CARE EDUCATION/TRAINING PROGRAM

## 2022-05-10 PROCEDURE — 99213 OFFICE O/P EST LOW 20 MIN: CPT | Performed by: STUDENT IN AN ORGANIZED HEALTH CARE EDUCATION/TRAINING PROGRAM

## 2022-05-10 RX ORDER — IBUPROFEN 200 MG
200 TABLET ORAL EVERY 6 HOURS PRN
Qty: 90 TABLET | Refills: 3 | Status: SHIPPED | OUTPATIENT
Start: 2022-05-10 | End: 2022-05-10 | Stop reason: SDUPTHER

## 2022-05-10 RX ORDER — FLUTICASONE PROPIONATE 50 MCG
1 SPRAY, SUSPENSION (ML) NASAL DAILY PRN
Qty: 11.1 ML | Refills: 12 | Status: SHIPPED | OUTPATIENT
Start: 2022-05-10

## 2022-05-10 RX ORDER — IBUPROFEN 200 MG
200 TABLET ORAL EVERY 6 HOURS PRN
COMMUNITY
End: 2022-05-10 | Stop reason: SDUPTHER

## 2022-05-10 RX ORDER — IBUPROFEN 200 MG
200 TABLET ORAL EVERY 6 HOURS PRN
Qty: 90 TABLET | Refills: 3 | Status: SHIPPED | OUTPATIENT
Start: 2022-05-10

## 2022-05-10 NOTE — PROGRESS NOTES
"Chief Complaint  Annual physical/paperwork and following up on seasonal allergies/skin PPD  Subjective         Shelly Gray is a 21 y.o. female who presents to [facility]  19 years old female comes to the clinic today for annual physical/state paperwork and following up on seasonal allergies.    Patient had well woman exam at OB/GYN.    Caregiver present in the room.  Patient reports no acute concerns.  Patient is doing well, caregiver reports no concerns.    Denies any chest pain or shortness of breath on exertion, physically very active.  Eating healthy diet as well.    Patient does report controlled seasonal allergies on Flonase.  Review of Systems   Objective   Vital Signs:   Vitals:    05/10/22 0811   BP: 112/76   Pulse: 84   Resp: 18   Temp: 98 °F (36.7 °C)   SpO2: 98%   Weight: 106 kg (233 lb 4.8 oz)   Height: 154.9 cm (61\")      Body mass index is 44.08 kg/m².   Physical Exam  Vitals reviewed.   Constitutional:       Appearance: Normal appearance. She is well-developed.   HENT:      Head: Normocephalic and atraumatic.      Right Ear: External ear normal.      Left Ear: External ear normal.      Mouth/Throat:      Pharynx: No oropharyngeal exudate.   Eyes:      Conjunctiva/sclera: Conjunctivae normal.      Pupils: Pupils are equal, round, and reactive to light.   Cardiovascular:      Rate and Rhythm: Normal rate and regular rhythm.      Heart sounds: No murmur heard.    No friction rub. No gallop.   Pulmonary:      Effort: Pulmonary effort is normal.      Breath sounds: Normal breath sounds. No wheezing or rhonchi.   Abdominal:      General: Bowel sounds are normal. There is no distension.      Palpations: Abdomen is soft.      Tenderness: There is no abdominal tenderness.   Skin:     General: Skin is warm and dry.   Neurological:      Mental Status: She is alert and oriented to person, place, and time.      Cranial Nerves: No cranial nerve deficit.   Psychiatric:         " Mood and Affect: Mood and affect normal.         Behavior: Behavior normal.         Thought Content: Thought content normal.         Judgment: Judgment normal.            Patient has been erroneously marked as diabetic. Based on the available clinical information, she does not have diabetes and should therefore be excluded from diabetic health maintenance and quality measures for the remainder of the reporting period.         Assessment and Plan   Diagnoses and all orders for this visit:    1. Seasonal allergies (Primary)  Comments:  Continue with Flonase  Orders:  -     fluticasone (Flonase) 50 MCG/ACT nasal spray; 1 spray into the nostril(s) as directed by provider Daily As Needed for Rhinitis.  Dispense: 11.1 mL; Refill: 12    2. Hypoesthesia of skin    3. Developmental delay    4. Annual physical exam  Comments:  State paperwork filled, need PPD placed, come back on Thursday for reading    5. Encounter for PPD test  Comments:  PPD placed  Orders:  -     TB Skin Test    Other orders  -     Discontinue: ibuprofen (ADVIL,MOTRIN) 200 MG tablet; Take 1 tablet by mouth Every 6 (Six) Hours As Needed for Mild Pain  or Headache.  Dispense: 90 tablet; Refill: 3  -     ibuprofen (ADVIL,MOTRIN) 200 MG tablet; Take 1 tablet by mouth Every 6 (Six) Hours As Needed for Mild Pain  or Headache.  Dispense: 90 tablet; Refill: 3            Follow Up   Return in about 1 year (around 5/10/2023) for Annual physical.  Patient was given instructions and counseling regarding her condition or for health maintenance advice. Please see specific information pulled into the AVS if appropriate.

## 2022-05-12 ENCOUNTER — CLINICAL SUPPORT (OUTPATIENT)
Dept: FAMILY MEDICINE CLINIC | Facility: CLINIC | Age: 22
End: 2022-05-12

## 2022-05-12 DIAGNOSIS — Z11.1 ENCOUNTER FOR PPD TEST: Primary | ICD-10-CM

## 2022-05-12 LAB
INDURATION: 0 MM (ref 0–10)
Lab: NORMAL
Lab: NORMAL
TB SKIN TEST: NEGATIVE

## 2023-05-11 ENCOUNTER — OFFICE VISIT (OUTPATIENT)
Dept: FAMILY MEDICINE CLINIC | Facility: CLINIC | Age: 23
End: 2023-05-11
Payer: MEDICAID

## 2023-05-11 VITALS
HEART RATE: 79 BPM | BODY MASS INDEX: 44.6 KG/M2 | SYSTOLIC BLOOD PRESSURE: 122 MMHG | TEMPERATURE: 97.7 F | WEIGHT: 236.2 LBS | OXYGEN SATURATION: 97 % | DIASTOLIC BLOOD PRESSURE: 74 MMHG | RESPIRATION RATE: 16 BRPM | HEIGHT: 61 IN

## 2023-05-11 DIAGNOSIS — Z11.3 SCREEN FOR STD (SEXUALLY TRANSMITTED DISEASE): ICD-10-CM

## 2023-05-11 DIAGNOSIS — Z00.00 ANNUAL PHYSICAL EXAM: Primary | ICD-10-CM

## 2023-05-11 DIAGNOSIS — N89.8 VAGINAL DISCHARGE: ICD-10-CM

## 2023-05-11 DIAGNOSIS — Z12.4 SCREENING FOR CERVICAL CANCER: ICD-10-CM

## 2023-05-11 LAB
C TRACH RRNA CVX QL NAA+PROBE: NOT DETECTED
CANDIDA SPECIES: NEGATIVE
GARDNERELLA VAGINALIS: POSITIVE
N GONORRHOEA RRNA SPEC QL NAA+PROBE: NOT DETECTED
T VAGINALIS DNA VAG QL PROBE+SIG AMP: NEGATIVE

## 2023-05-11 PROCEDURE — 87660 TRICHOMONAS VAGIN DIR PROBE: CPT | Performed by: STUDENT IN AN ORGANIZED HEALTH CARE EDUCATION/TRAINING PROGRAM

## 2023-05-11 PROCEDURE — G0123 SCREEN CERV/VAG THIN LAYER: HCPCS | Performed by: STUDENT IN AN ORGANIZED HEALTH CARE EDUCATION/TRAINING PROGRAM

## 2023-05-11 PROCEDURE — 87591 N.GONORRHOEAE DNA AMP PROB: CPT | Performed by: STUDENT IN AN ORGANIZED HEALTH CARE EDUCATION/TRAINING PROGRAM

## 2023-05-11 PROCEDURE — 87491 CHLMYD TRACH DNA AMP PROBE: CPT | Performed by: STUDENT IN AN ORGANIZED HEALTH CARE EDUCATION/TRAINING PROGRAM

## 2023-05-11 PROCEDURE — 87624 HPV HI-RISK TYP POOLED RSLT: CPT | Performed by: STUDENT IN AN ORGANIZED HEALTH CARE EDUCATION/TRAINING PROGRAM

## 2023-05-11 PROCEDURE — 87510 GARDNER VAG DNA DIR PROBE: CPT | Performed by: STUDENT IN AN ORGANIZED HEALTH CARE EDUCATION/TRAINING PROGRAM

## 2023-05-11 PROCEDURE — 87480 CANDIDA DNA DIR PROBE: CPT | Performed by: STUDENT IN AN ORGANIZED HEALTH CARE EDUCATION/TRAINING PROGRAM

## 2023-05-11 RX ORDER — FLUCONAZOLE 150 MG/1
150 TABLET ORAL ONCE
Qty: 1 TABLET | Refills: 0 | Status: SHIPPED | OUTPATIENT
Start: 2023-05-11 | End: 2023-05-11

## 2023-05-11 NOTE — PROGRESS NOTES
"Chief Complaint  Annual Physical and PAP    Subjective         Shelly Gray is a 22 y.o. female who presents to [facility]    22 years old comes to the clinic today for annual physical and Pap smear.    Reports no history of abnormal Pap smear.  Currently not sexually active, denies any STD/STI.     Denies any vaginal discharge, abnormal bleeding.    Physically active without any chest pain or shortness of breath on exertion.        Objective   Vital Signs:   Vitals:    05/11/23 0807   BP: 122/74   BP Location: Left arm   Patient Position: Sitting   Cuff Size: Large Adult   Pulse: 79   Resp: 16   Temp: 97.7 °F (36.5 °C)   TempSrc: Temporal   SpO2: 97%   Weight: 107 kg (236 lb 3.2 oz)   Height: 154.9 cm (61\")      Body mass index is 44.63 kg/m².   Wt Readings from Last 3 Encounters:   05/11/23 107 kg (236 lb 3.2 oz)   05/10/22 106 kg (233 lb 4.8 oz)   12/17/21 105 kg (230 lb 6.4 oz)      BP Readings from Last 3 Encounters:   05/11/23 122/74   05/10/22 112/76   12/17/21 126/80        Patient Care Team:  Edwin Crowe MD as PCP - General (Family Medicine)     Physical Exam  Vitals reviewed. Exam conducted with a chaperone present.   Constitutional:       Appearance: Normal appearance. She is well-developed.   HENT:      Head: Normocephalic and atraumatic.      Right Ear: External ear normal.      Left Ear: External ear normal.      Mouth/Throat:      Pharynx: No oropharyngeal exudate.   Eyes:      Conjunctiva/sclera: Conjunctivae normal.      Pupils: Pupils are equal, round, and reactive to light.   Cardiovascular:      Rate and Rhythm: Normal rate and regular rhythm.      Heart sounds: No murmur heard.    No friction rub. No gallop.   Pulmonary:      Effort: Pulmonary effort is normal.      Breath sounds: Normal breath sounds. No wheezing or rhonchi.   Chest:      Chest wall: No mass.   Breasts:     Right: Normal.      Left: Normal.   Abdominal:      General: Bowel sounds are " normal. There is no distension.      Palpations: Abdomen is soft.      Tenderness: There is no abdominal tenderness.   Genitourinary:     Labia:         Right: No rash.         Left: No rash.       Urethra: No prolapse.      Vagina: Vaginal discharge present.      Cervix: Normal.   Skin:     General: Skin is warm and dry.   Neurological:      Mental Status: She is alert and oriented to person, place, and time.      Cranial Nerves: No cranial nerve deficit.   Psychiatric:         Mood and Affect: Mood and affect normal.         Behavior: Behavior normal.         Thought Content: Thought content normal.         Judgment: Judgment normal.              Patient has been erroneously marked as diabetic. Based on the available clinical information, she does not have diabetes and should therefore be excluded from diabetic health maintenance and quality measures for the remainder of the reporting period.         Assessment and Plan   Diagnoses and all orders for this visit:    1. Annual physical exam (Primary)  Comments:  Daily exercise and healthy diet recommended  Orders:  -     Gardnerella vaginalis, Trichomonas vaginalis, Candida albicans, DNA - Swab, Vagina; Future  -     IgP, Aptima HPV; Future  -     Chlamydia trachomatis, Neisseria gonorrhoeae, PCR - Swab, Cervix  -     Gardnerella vaginalis, Trichomonas vaginalis, Candida albicans, DNA - Swab, Vagina  -     IgP, Aptima HPV    2. Screening for cervical cancer  -     Gardnerella vaginalis, Trichomonas vaginalis, Candida albicans, DNA - Swab, Vagina; Future  -     IgP, Aptima HPV; Future  -     Chlamydia trachomatis, Neisseria gonorrhoeae, PCR - Swab, Cervix  -     Gardnerella vaginalis, Trichomonas vaginalis, Candida albicans, DNA - Swab, Vagina  -     IgP, Aptima HPV    3. Screen for STD (sexually transmitted disease)  -     Gardnerella vaginalis, Trichomonas vaginalis, Candida albicans, DNA - Swab, Vagina; Future  -     IgP, Aptima HPV; Future  -     Chlamydia  trachomatis, Neisseria gonorrhoeae, PCR - Swab, Cervix  -     Gardnerella vaginalis, Trichomonas vaginalis, Candida albicans, DNA - Swab, Vagina  -     IgP, Aptima HPV    4. Vaginal discharge  -     fluconazole (Diflucan) 150 MG tablet; Take 1 tablet by mouth 1 (One) Time for 1 dose.  Dispense: 1 tablet; Refill: 0          Tobacco Use: Low Risk    • Smoking Tobacco Use: Never   • Smokeless Tobacco Use: Never   • Passive Exposure: Not on file            Follow Up   No follow-ups on file.  Patient was given instructions and counseling regarding her condition or for health maintenance advice. Please see specific information pulled into the AVS if appropriate.

## 2023-05-12 DIAGNOSIS — N76.0 BV (BACTERIAL VAGINOSIS): Primary | ICD-10-CM

## 2023-05-12 DIAGNOSIS — B96.89 BV (BACTERIAL VAGINOSIS): Primary | ICD-10-CM

## 2023-05-12 RX ORDER — METRONIDAZOLE 500 MG/1
500 TABLET ORAL 2 TIMES DAILY
Qty: 14 TABLET | Refills: 0 | Status: SHIPPED | OUTPATIENT
Start: 2023-05-12

## 2023-05-18 LAB
CYTOLOGIST CVX/VAG CYTO: NORMAL
CYTOLOGY CVX/VAG DOC CYTO: NORMAL
CYTOLOGY CVX/VAG DOC THIN PREP: NORMAL
DX ICD CODE: NORMAL
HIV 1 & 2 AB SER-IMP: NORMAL
HPV I/H RISK 4 DNA CVX QL PROBE+SIG AMP: NEGATIVE
OTHER STN SPEC: NORMAL
STAT OF ADQ CVX/VAG CYTO-IMP: NORMAL

## 2023-08-31 ENCOUNTER — TELEPHONE (OUTPATIENT)
Dept: FAMILY MEDICINE CLINIC | Facility: CLINIC | Age: 23
End: 2023-08-31
Payer: MEDICAID

## 2023-08-31 NOTE — TELEPHONE ENCOUNTER
Caller: WILLIAM SHIRLEY    Relationship: Caregiver (non-relative)    Best call back number: [phone]    What form or medical record are you requesting: DISCONTINUE ORDER    Who is requesting this form or medical record from you: CAREGIVER    How would you like to receive the form or medical records (pick-up, mail, fax):     Timeframe paperwork needed: ASAP    Additional notes: WILLIAM CALLED STATING THAT SHE NEEDS A DISCONTINUE ORDER FOR THE PATIENTS PRN MEDICATION: FLONASE AND IBUPROFEN. THE ORDER NEEDS TO BE WRITTEN FROM THE LAST APPOINTMENT THEY HAD ON 5/11/23. SHE WOULD LIKE A CALL WHEN IT IS READY TO BE PICKED UP.

## 2023-08-31 NOTE — TELEPHONE ENCOUNTER
Called Irina at [facility] left voicemail to let her know we letter available to discontinue Flonase and ibuprofen.I also left voicemail for patient and it is up front for .

## 2024-05-17 ENCOUNTER — OFFICE VISIT (OUTPATIENT)
Dept: FAMILY MEDICINE CLINIC | Facility: CLINIC | Age: 24
End: 2024-05-17
Payer: MEDICAID

## 2024-05-17 VITALS
RESPIRATION RATE: 16 BRPM | OXYGEN SATURATION: 97 % | TEMPERATURE: 97.8 F | HEART RATE: 102 BPM | BODY MASS INDEX: 49.65 KG/M2 | DIASTOLIC BLOOD PRESSURE: 70 MMHG | WEIGHT: 263 LBS | SYSTOLIC BLOOD PRESSURE: 112 MMHG | HEIGHT: 61 IN

## 2024-05-17 DIAGNOSIS — Z00.00 ANNUAL PHYSICAL EXAM: Primary | ICD-10-CM

## 2024-05-17 DIAGNOSIS — E66.01 MORBID OBESITY WITH BMI OF 45.0-49.9, ADULT: ICD-10-CM

## 2024-05-17 PROCEDURE — 99395 PREV VISIT EST AGE 18-39: CPT | Performed by: STUDENT IN AN ORGANIZED HEALTH CARE EDUCATION/TRAINING PROGRAM

## 2024-05-17 PROCEDURE — 1126F AMNT PAIN NOTED NONE PRSNT: CPT | Performed by: STUDENT IN AN ORGANIZED HEALTH CARE EDUCATION/TRAINING PROGRAM

## 2024-05-17 PROCEDURE — 2014F MENTAL STATUS ASSESS: CPT | Performed by: STUDENT IN AN ORGANIZED HEALTH CARE EDUCATION/TRAINING PROGRAM

## 2024-05-17 NOTE — PROGRESS NOTES
"Chief Complaint  Annual Exam    Subjective         Shelly Gray is a 23 y.o. female who presents to [facility]    23 years old comes to the clinic today for annual physical.    Patient has sport paperwork for PCP.    Caregiver present in the room.    Patient is doing well at home, reports no acute concerns.  Physically active without any difficulties, eating healthy diet.    12+ review of systems are unremarkable otherwise    Objective   Vital Signs:   Vitals:    05/17/24 0809   BP: 112/70   Pulse: 102   Resp: 16   Temp: 97.8 °F (36.6 °C)   SpO2: 97%   Weight: 119 kg (263 lb)   Height: 154.9 cm (61\")   PainSc: 0-No pain      Body mass index is 49.69 kg/m².   Wt Readings from Last 3 Encounters:   05/17/24 119 kg (263 lb)   05/11/23 107 kg (236 lb 3.2 oz)   05/10/22 106 kg (233 lb 4.8 oz)      BP Readings from Last 3 Encounters:   05/17/24 112/70   05/11/23 122/74   05/10/22 112/76        Patient Care Team:  Edwin Crowe MD as PCP - General (Family Medicine)     Physical Exam  Vitals reviewed. Exam conducted with a chaperone present.   Constitutional:       Appearance: Normal appearance. She is well-developed.   HENT:      Head: Normocephalic and atraumatic.      Right Ear: External ear normal.      Left Ear: External ear normal.      Mouth/Throat:      Pharynx: No oropharyngeal exudate.   Eyes:      Conjunctiva/sclera: Conjunctivae normal.      Pupils: Pupils are equal, round, and reactive to light.   Cardiovascular:      Rate and Rhythm: Normal rate and regular rhythm.      Heart sounds: No murmur heard.     No friction rub. No gallop.   Pulmonary:      Effort: Pulmonary effort is normal.      Breath sounds: Normal breath sounds. No wheezing or rhonchi.   Chest:      Chest wall: No mass.   Breasts:     Right: Normal.      Left: Normal.   Abdominal:      General: Bowel sounds are normal. There is no distension.      Palpations: Abdomen is soft.      Tenderness: There is no " abdominal tenderness.   Skin:     General: Skin is warm and dry.   Neurological:      Mental Status: She is alert and oriented to person, place, and time.      Cranial Nerves: No cranial nerve deficit.   Psychiatric:         Mood and Affect: Mood and affect normal.         Behavior: Behavior normal.         Thought Content: Thought content normal.         Judgment: Judgment normal.            Class 3 Severe Obesity (BMI >=40). Obesity-related health conditions include the following: obstructive sleep apnea, hypertension, coronary heart disease, and diabetes mellitus. Obesity is unchanged. BMI is is above average; BMI management plan is completed. We discussed portion control and increasing exercise.              Assessment and Plan   Diagnoses and all orders for this visit:    1. Annual physical exam (Primary)  Comments:  Daily exercise and healthy diet recommended  Orders:  -     TSH Rfx On Abnormal To Free T4; Future  -     CBC & Differential; Future  -     Comprehensive Metabolic Panel; Future  -     Hemoglobin A1c; Future  -     Lipid Panel; Future  -     Urinalysis With Microscopic - Urine, Clean Catch; Future    2. Morbid obesity with BMI of 45.0-49.9, adult  Comments:  Lifestyle modifications discussed          Tobacco Use: Low Risk  (5/17/2024)    Patient History     Smoking Tobacco Use: Never     Smokeless Tobacco Use: Never     Passive Exposure: Never            Follow Up   No follow-ups on file.  Patient was given instructions and counseling regarding her condition or for health maintenance advice. Please see specific information pulled into the AVS if appropriate.

## 2024-07-01 ENCOUNTER — LAB (OUTPATIENT)
Dept: LAB | Facility: HOSPITAL | Age: 24
End: 2024-07-01
Payer: MEDICAID

## 2024-07-01 DIAGNOSIS — Z00.00 ANNUAL PHYSICAL EXAM: ICD-10-CM

## 2024-07-01 LAB
ALBUMIN SERPL-MCNC: 4.1 G/DL (ref 3.5–5.2)
ALBUMIN/GLOB SERPL: 1.1 G/DL
ALP SERPL-CCNC: 123 U/L (ref 39–117)
ALT SERPL W P-5'-P-CCNC: 21 U/L (ref 1–33)
ANION GAP SERPL CALCULATED.3IONS-SCNC: 9.4 MMOL/L (ref 5–15)
AST SERPL-CCNC: 22 U/L (ref 1–32)
BACTERIA UR QL AUTO: ABNORMAL /HPF
BASOPHILS # BLD AUTO: 0.02 10*3/MM3 (ref 0–0.2)
BASOPHILS NFR BLD AUTO: 0.3 % (ref 0–1.5)
BILIRUB SERPL-MCNC: 1.1 MG/DL (ref 0–1.2)
BILIRUB UR QL STRIP: NEGATIVE
BUN SERPL-MCNC: 13 MG/DL (ref 6–20)
BUN/CREAT SERPL: 16 (ref 7–25)
CALCIUM SPEC-SCNC: 9.3 MG/DL (ref 8.6–10.5)
CHLORIDE SERPL-SCNC: 106 MMOL/L (ref 98–107)
CHOLEST SERPL-MCNC: 156 MG/DL (ref 0–200)
CLARITY UR: ABNORMAL
CO2 SERPL-SCNC: 23.6 MMOL/L (ref 22–29)
COLOR UR: YELLOW
CREAT SERPL-MCNC: 0.81 MG/DL (ref 0.57–1)
DEPRECATED RDW RBC AUTO: 39.8 FL (ref 37–54)
EGFRCR SERPLBLD CKD-EPI 2021: 104.1 ML/MIN/1.73
EOSINOPHIL # BLD AUTO: 0.06 10*3/MM3 (ref 0–0.4)
EOSINOPHIL NFR BLD AUTO: 0.9 % (ref 0.3–6.2)
ERYTHROCYTE [DISTWIDTH] IN BLOOD BY AUTOMATED COUNT: 12.7 % (ref 12.3–15.4)
GLOBULIN UR ELPH-MCNC: 3.7 GM/DL
GLUCOSE SERPL-MCNC: 98 MG/DL (ref 65–99)
GLUCOSE UR STRIP-MCNC: NEGATIVE MG/DL
HBA1C MFR BLD: 5.4 % (ref 4.8–5.6)
HCT VFR BLD AUTO: 42.8 % (ref 34–46.6)
HDLC SERPL-MCNC: 43 MG/DL (ref 40–60)
HGB BLD-MCNC: 14.1 G/DL (ref 12–15.9)
HGB UR QL STRIP.AUTO: NEGATIVE
HYALINE CASTS UR QL AUTO: ABNORMAL /LPF
IMM GRANULOCYTES # BLD AUTO: 0.01 10*3/MM3 (ref 0–0.05)
IMM GRANULOCYTES NFR BLD AUTO: 0.1 % (ref 0–0.5)
KETONES UR QL STRIP: ABNORMAL
LDLC SERPL CALC-MCNC: 100 MG/DL (ref 0–100)
LDLC/HDLC SERPL: 2.31 {RATIO}
LEUKOCYTE ESTERASE UR QL STRIP.AUTO: ABNORMAL
LYMPHOCYTES # BLD AUTO: 2.12 10*3/MM3 (ref 0.7–3.1)
LYMPHOCYTES NFR BLD AUTO: 31.2 % (ref 19.6–45.3)
MCH RBC QN AUTO: 28.7 PG (ref 26.6–33)
MCHC RBC AUTO-ENTMCNC: 32.9 G/DL (ref 31.5–35.7)
MCV RBC AUTO: 87.2 FL (ref 79–97)
MONOCYTES # BLD AUTO: 0.49 10*3/MM3 (ref 0.1–0.9)
MONOCYTES NFR BLD AUTO: 7.2 % (ref 5–12)
NEUTROPHILS NFR BLD AUTO: 4.09 10*3/MM3 (ref 1.7–7)
NEUTROPHILS NFR BLD AUTO: 60.3 % (ref 42.7–76)
NITRITE UR QL STRIP: NEGATIVE
NRBC BLD AUTO-RTO: 0 /100 WBC (ref 0–0.2)
PH UR STRIP.AUTO: 6 [PH] (ref 5–8)
PLATELET # BLD AUTO: 208 10*3/MM3 (ref 140–450)
PMV BLD AUTO: 11.6 FL (ref 6–12)
POTASSIUM SERPL-SCNC: 4.3 MMOL/L (ref 3.5–5.2)
PROT SERPL-MCNC: 7.8 G/DL (ref 6–8.5)
PROT UR QL STRIP: ABNORMAL
RBC # BLD AUTO: 4.91 10*6/MM3 (ref 3.77–5.28)
RBC # UR STRIP: ABNORMAL /HPF
REF LAB TEST METHOD: ABNORMAL
SODIUM SERPL-SCNC: 139 MMOL/L (ref 136–145)
SP GR UR STRIP: >1.03 (ref 1–1.03)
SQUAMOUS #/AREA URNS HPF: ABNORMAL /HPF
TRIGL SERPL-MCNC: 68 MG/DL (ref 0–150)
TSH SERPL DL<=0.05 MIU/L-ACNC: 2.55 UIU/ML (ref 0.27–4.2)
UROBILINOGEN UR QL STRIP: ABNORMAL
VLDLC SERPL-MCNC: 13 MG/DL (ref 5–40)
WBC # UR STRIP: ABNORMAL /HPF
WBC NRBC COR # BLD AUTO: 6.79 10*3/MM3 (ref 3.4–10.8)

## 2024-07-01 PROCEDURE — 80061 LIPID PANEL: CPT

## 2024-07-01 PROCEDURE — 85025 COMPLETE CBC W/AUTO DIFF WBC: CPT

## 2024-07-01 PROCEDURE — 36415 COLL VENOUS BLD VENIPUNCTURE: CPT

## 2024-07-01 PROCEDURE — 81001 URINALYSIS AUTO W/SCOPE: CPT

## 2024-07-01 PROCEDURE — 84443 ASSAY THYROID STIM HORMONE: CPT

## 2024-07-01 PROCEDURE — 80053 COMPREHEN METABOLIC PANEL: CPT

## 2024-07-01 PROCEDURE — 83036 HEMOGLOBIN GLYCOSYLATED A1C: CPT

## 2025-05-20 ENCOUNTER — OFFICE VISIT (OUTPATIENT)
Dept: FAMILY MEDICINE CLINIC | Facility: CLINIC | Age: 25
End: 2025-05-20
Payer: MEDICAID

## 2025-05-20 VITALS
DIASTOLIC BLOOD PRESSURE: 78 MMHG | BODY MASS INDEX: 49.56 KG/M2 | RESPIRATION RATE: 20 BRPM | WEIGHT: 262.5 LBS | HEIGHT: 61 IN | SYSTOLIC BLOOD PRESSURE: 122 MMHG | HEART RATE: 93 BPM | TEMPERATURE: 97.5 F | OXYGEN SATURATION: 98 %

## 2025-05-20 DIAGNOSIS — Z00.00 ANNUAL PHYSICAL EXAM: Primary | ICD-10-CM

## 2025-05-20 DIAGNOSIS — E28.2 PCOS (POLYCYSTIC OVARIAN SYNDROME): ICD-10-CM

## 2025-05-20 DIAGNOSIS — E66.01 MORBID OBESITY WITH BMI OF 45.0-49.9, ADULT: ICD-10-CM

## 2025-05-20 DIAGNOSIS — F79 MENTAL DISABILITY: ICD-10-CM

## 2025-05-20 NOTE — PROGRESS NOTES
"Chief Complaint  Annual physical    Subjective         Shelly Gray is a 24 y.o. female who presents to [facility]    24 years old comes to the clinic today for annual physical.    Patient reports no acute concerns, physically active without any difficulties.    Up-to-date with Pap smear.  Up-to-date with preventive cares.    Had blood work done in last July with no abnormal findings.    Patient has tried to lose some weight but unsuccessful so far.    Patient is physically otherwise active and reports no chest pain or shortness of breath on exertion.    12+ review of systems are unremarkable otherwise    Objective   Vital Signs:   Vitals:    05/20/25 0711   BP: 122/78   Pulse: 93   Resp: 20   Temp: 97.5 °F (36.4 °C)   SpO2: 98%   Weight: 119 kg (262 lb 8 oz)   Height: 154.9 cm (61\")      Body mass index is 49.6 kg/m².   Wt Readings from Last 3 Encounters:   05/20/25 119 kg (262 lb 8 oz)   05/17/24 119 kg (263 lb)   05/11/23 107 kg (236 lb 3.2 oz)      BP Readings from Last 3 Encounters:   05/20/25 122/78   10/09/24 (!) 145/111   05/17/24 112/70        Patient Care Team:  Edwin Crowe MD as PCP - General (Family Medicine)  Talita Andrea DO as Consulting Physician (Obstetrics and Gynecology)     Physical Exam  Vitals reviewed.   Constitutional:       Appearance: Normal appearance. She is well-developed.   HENT:      Head: Normocephalic and atraumatic.      Right Ear: External ear normal.      Left Ear: External ear normal.      Mouth/Throat:      Pharynx: No oropharyngeal exudate.   Eyes:      Conjunctiva/sclera: Conjunctivae normal.      Pupils: Pupils are equal, round, and reactive to light.   Cardiovascular:      Rate and Rhythm: Normal rate and regular rhythm.      Heart sounds: No murmur heard.     No friction rub. No gallop.   Pulmonary:      Effort: Pulmonary effort is normal.      Breath sounds: Normal breath sounds. No wheezing or rhonchi.   Abdominal:      " General: Bowel sounds are normal. There is no distension.      Palpations: Abdomen is soft.      Tenderness: There is no abdominal tenderness.   Skin:     General: Skin is warm and dry.   Neurological:      Mental Status: She is alert and oriented to person, place, and time.      Cranial Nerves: No cranial nerve deficit.   Psychiatric:         Mood and Affect: Mood and affect normal.         Behavior: Behavior normal.         Thought Content: Thought content normal.         Judgment: Judgment normal.            Class 3 Severe Obesity (BMI >=40). Obesity-related health conditions include the following: obstructive sleep apnea, hypertension, and coronary heart disease. Obesity is unchanged. BMI is is above average; BMI management plan is completed. We discussed portion control and increasing exercise.              Assessment and Plan   Diagnoses and all orders for this visit:    1. Annual physical exam (Primary)  Comments:  Daily exercise and healthy diet recommended    2. PCOS (polycystic ovarian syndrome)    3. Mental disability    4. Morbid obesity with BMI of 45.0-49.9, adult  Comments:  Lifestyle modifications discussed          Tobacco Use: Low Risk  (5/20/2025)    Patient History     Smoking Tobacco Use: Never     Smokeless Tobacco Use: Never     Passive Exposure: Never            Follow Up   Return in about 1 year (around 5/27/2026) for Annual physical.  Patient was given instructions and counseling regarding her condition or for health maintenance advice. Please see specific information pulled into the AVS if appropriate.